# Patient Record
Sex: FEMALE | URBAN - METROPOLITAN AREA
[De-identification: names, ages, dates, MRNs, and addresses within clinical notes are randomized per-mention and may not be internally consistent; named-entity substitution may affect disease eponyms.]

---
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
## 2022-06-07 NOTE — PROCEDURE: MIPS QUALITY
I spoke with the patient at the home number listed to provide the message from Dr. Sadie Buchanan below. She is now scheduled to see Dr. Sadie Buchanan in office 6- 3:45 pm.    I will close this phone note.
[facility]    She should be good, but let me see her 1 more time just to confirm that she wants a reduction vs a mastopexy given her measurements. Thanks!   NK
Patient called to get an update regarding scheduling for breast reduction surgery.      Please contact at [phone]
The patient was in the office to see Dr. Vickie Spear 5-2-2022. PLAN: Would benefit from breast reduction. Once mammogram obtained, will submit to insurance and schedule    The patient had her mammogram 5-. The results are in [facility] Please review. If okay to move forward with surgery please send a new surgery letter.     I will route to MD.
Quality 110: Preventive Care And Screening: Influenza Immunization: Influenza Immunization Administered during Influenza season
Detail Level: Detailed